# Patient Record
Sex: FEMALE | Race: BLACK OR AFRICAN AMERICAN | Employment: PART TIME | URBAN - METROPOLITAN AREA
[De-identification: names, ages, dates, MRNs, and addresses within clinical notes are randomized per-mention and may not be internally consistent; named-entity substitution may affect disease eponyms.]

---

## 2022-12-28 ENCOUNTER — APPOINTMENT (OUTPATIENT)
Dept: LAB | Facility: CLINIC | Age: 32
End: 2022-12-28

## 2022-12-28 ENCOUNTER — OCCMED (OUTPATIENT)
Dept: URGENT CARE | Facility: CLINIC | Age: 32
End: 2022-12-28

## 2022-12-28 DIAGNOSIS — Z02.1 PRE-EMPLOYMENT HEALTH SCREENING EXAMINATION: Primary | ICD-10-CM

## 2022-12-28 DIAGNOSIS — Z02.1 PRE-EMPLOYMENT HEALTH SCREENING EXAMINATION: ICD-10-CM

## 2022-12-28 LAB — RUBV IGG SERPL IA-ACNC: 43.9 IU/ML

## 2022-12-29 LAB
GAMMA INTERFERON BACKGROUND BLD IA-ACNC: 0.1 IU/ML
M TB IFN-G BLD-IMP: NEGATIVE
M TB IFN-G CD4+ BCKGRND COR BLD-ACNC: 0.01 IU/ML
M TB IFN-G CD4+ BCKGRND COR BLD-ACNC: 0.01 IU/ML
MEV IGG SER QL IA: NORMAL
MITOGEN IGNF BCKGRD COR BLD-ACNC: 7.79 IU/ML
MUV IGG SER QL IA: NORMAL
VZV IGG SER QL IA: NORMAL

## 2023-06-22 ENCOUNTER — OFFICE VISIT (OUTPATIENT)
Dept: FAMILY MEDICINE CLINIC | Facility: CLINIC | Age: 33
End: 2023-06-22
Payer: COMMERCIAL

## 2023-06-22 VITALS
HEART RATE: 71 BPM | SYSTOLIC BLOOD PRESSURE: 112 MMHG | HEIGHT: 64 IN | TEMPERATURE: 97.6 F | BODY MASS INDEX: 33.46 KG/M2 | WEIGHT: 196 LBS | OXYGEN SATURATION: 99 % | DIASTOLIC BLOOD PRESSURE: 66 MMHG

## 2023-06-22 DIAGNOSIS — H93.12 TINNITUS OF LEFT EAR: ICD-10-CM

## 2023-06-22 DIAGNOSIS — Z76.89 ENCOUNTER TO ESTABLISH CARE WITH NEW DOCTOR: Primary | ICD-10-CM

## 2023-06-22 PROCEDURE — 99204 OFFICE O/P NEW MOD 45 MIN: CPT | Performed by: FAMILY MEDICINE

## 2023-06-22 NOTE — PROGRESS NOTES
Name: Gene White      : 1990      MRN: [id number]  Encounter Provider: Emeli Washington DO  Encounter Date: 2023   Encounter department: [facility] [address] [address]     1  Encounter to establish care with new doctor    2  Tinnitus of left ear  -     Ambulatory Referral to Audiology; Future  -     MRI brain w wo contrast; Future; Expected date: 2023     Will evaluate for hearing loss  Due to unilateral presentation, pulsatile description and persistence of greater than 6 months with know identifiable cause, will image as well  BMI Counseling: Body mass index is 33 38 kg/m²  The BMI is above normal  Nutrition recommendations include decreasing portion sizes, encouraging healthy choices of fruits and vegetables, consuming healthier snacks, limiting drinks that contain sugar, moderation in carbohydrate intake, increasing intake of lean protein and reducing intake of cholesterol  Exercise recommendations include moderate physical activity 150 minutes/week and exercising 3-5 times per week  No pharmacotherapy was ordered  Rationale for BMI follow-up plan is due to patient being overweight or obese  Depression Screening and Follow-up Plan: Patient was screened for depression during today's encounter  They screened negative with a PHQ-2 score of 0  Subjective     HPI     Patient presents to the office to establish care  Reports left ear ringing since 2023  Denies any recent ear infections or URI  Notes that the ringing came out of nowhere  Reports that this has been constant since that time  Notes that the last few weeks it has been a little less  Reports that during school it was loud and pulsatile  Describes this as being able to hear her heartbeat in her ear  Denies any issues on the right side  Denies headaches  Denies vision changes  Denies changes in hearing  Notes that laying down sometimes helps the tinnitus  "Unsure is valsalva makes it worse  Denies history of loud noise exposure  Notes that she does wear headphone but this is usually not \"too loud\" and in both ears  Denies frequent ear infections or ear surgery  Review of Systems    History reviewed  No pertinent past medical history  History reviewed  No pertinent surgical history  History reviewed  No pertinent family history  Social History     Socioeconomic History   • Marital status: Unknown     Spouse name: None   • Number of children: None   • Years of education: None   • Highest education level: None   Occupational History   • None   Tobacco Use   • Smoking status: Never   • Smokeless tobacco: Never   Vaping Use   • Vaping Use: Never used   Substance and Sexual Activity   • Alcohol use: Yes   • Drug use: Never   • Sexual activity: Not Currently   Other Topics Concern   • None   Social History Narrative   • None     Social Determinants of Health     Financial Resource Strain: Not on file   Food Insecurity: Not on file   Transportation Needs: Not on file   Physical Activity: Not on file   Stress: Not on file   Social Connections: Not on file   Intimate Partner Violence: Not on file   Housing Stability: Not on file     No current outpatient medications on file prior to visit  No Known Allergies  Immunization History   Administered Date(s) Administered   • Tdap 08/05/2022   • Tuberculin Skin Test-PPD Intradermal 08/17/2022, 09/02/2022       Objective     /66   Pulse 71   Temp 97 6 °F (36 4 °C)   Ht 5' 4 25\" (1 632 m)   Wt 88 9 kg (196 lb)   SpO2 99%   BMI 33 38 kg/m²     Physical Exam  Vitals reviewed  Constitutional:       General: She is not in acute distress  Appearance: Normal appearance  HENT:      Head: Normocephalic and atraumatic  Right Ear: Tympanic membrane, ear canal and external ear normal  There is no impacted cerumen  Left Ear: Tympanic membrane, ear canal and external ear normal  There is no impacted cerumen   " Nose: Nose normal       Mouth/Throat:      Mouth: Mucous membranes are moist    Eyes:      Extraocular Movements: Extraocular movements intact  Conjunctiva/sclera: Conjunctivae normal       Pupils: Pupils are equal, round, and reactive to light  Cardiovascular:      Rate and Rhythm: Normal rate and regular rhythm  Heart sounds: Normal heart sounds  Pulmonary:      Effort: Pulmonary effort is normal       Breath sounds: Normal breath sounds  No wheezing, rhonchi or rales  Abdominal:      General: Bowel sounds are normal  There is no distension  Palpations: Abdomen is soft  Tenderness: There is no abdominal tenderness  Musculoskeletal:      Cervical back: Neck supple  Right lower leg: No edema  Left lower leg: No edema  Lymphadenopathy:      Cervical: No cervical adenopathy  Skin:     General: Skin is warm  Capillary Refill: Capillary refill takes less than 2 seconds  Findings: No rash  Neurological:      General: No focal deficit present  Mental Status: She is alert and oriented to person, place, and time  Mental status is at baseline  Cranial Nerves: No cranial nerve deficit        Gait: Gait normal        Emelia Ritter DO

## 2023-06-27 ENCOUNTER — OFFICE VISIT (OUTPATIENT)
Dept: OBGYN CLINIC | Facility: CLINIC | Age: 33
End: 2023-06-27
Payer: COMMERCIAL

## 2023-06-27 VITALS
WEIGHT: 198 LBS | BODY MASS INDEX: 33.8 KG/M2 | HEIGHT: 64 IN | DIASTOLIC BLOOD PRESSURE: 60 MMHG | SYSTOLIC BLOOD PRESSURE: 102 MMHG

## 2023-06-27 DIAGNOSIS — Z01.419 ENCOUNTER FOR GYNECOLOGICAL EXAMINATION WITHOUT ABNORMAL FINDING: Primary | ICD-10-CM

## 2023-06-27 DIAGNOSIS — Z12.4 SCREENING FOR MALIGNANT NEOPLASM OF THE CERVIX: ICD-10-CM

## 2023-06-27 PROCEDURE — G0145 SCR C/V CYTO,THINLAYER,RESCR: HCPCS | Performed by: OBSTETRICS & GYNECOLOGY

## 2023-06-27 PROCEDURE — G0476 HPV COMBO ASSAY CA SCREEN: HCPCS | Performed by: OBSTETRICS & GYNECOLOGY

## 2023-06-27 PROCEDURE — S0610 ANNUAL GYNECOLOGICAL EXAMINA: HCPCS | Performed by: OBSTETRICS & GYNECOLOGY

## 2023-06-27 NOTE — PROGRESS NOTES
Assessment/Plan:         Diagnoses and all orders for this visit:    Encounter for gynecological examination without abnormal finding    Screening for malignant neoplasm of the cervix  -     Liquid-based pap, screening          Subjective:      Patient ID: John Bond is a 29 y o  female  The patient is a 82-year-old nulligravida who presents for annual exam   She has no complaints related to OB/GYN  She had menarche at age 17, has regular menstrual cycles every month  She has mild menorrhagia which she is only for the first day and dysmenorrhea which is controlled easily with over-the-counter analgesia  She does not miss work or school because of heavy bleeding or cramps  She has no ongoing medical problems and has been in very good health  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  She is also working in the [employer]  We will perform a Pap smear with HPV today  She should return in 1 year or as needed  The following portions of the patient's history were reviewed and updated as appropriate: allergies, current medications, past family history, past medical history, past social history, past surgical history and problem list     Review of Systems   Constitutional: Negative for chills, diaphoresis, fatigue, fever and unexpected weight change  HENT: Negative for congestion, sinus pressure, sinus pain, tinnitus and trouble swallowing  Eyes: Negative for visual disturbance  Respiratory: Negative for cough, chest tightness and shortness of breath  Cardiovascular: Negative for chest pain, palpitations and leg swelling  Gastrointestinal: Negative for abdominal distention, abdominal pain, anal bleeding, constipation, diarrhea, nausea, rectal pain and vomiting  Endocrine: Negative for heat intolerance  Genitourinary: Negative for difficulty urinating, dysuria, flank pain, frequency, genital sores, hematuria and urgency     Musculoskeletal: Negative "for arthralgias, back pain and joint swelling  Skin: Negative for rash  Allergic/Immunologic: Negative for environmental allergies and food allergies  Neurological: Negative for headaches  Hematological: Negative for adenopathy  Does not bruise/bleed easily  Psychiatric/Behavioral: Negative for decreased concentration and dysphoric mood  The patient is not nervous/anxious  Objective:      /60 (BP Location: Left arm)   Ht 5' 4\" (1 626 m)   Wt 89 8 kg (198 lb)   LMP 06/12/2023   BMI 33 99 kg/m²          Physical Exam  Vitals and nursing note reviewed  Exam conducted with a chaperone present  Constitutional:       General: She is not in acute distress  Appearance: Normal appearance  She is normal weight  She is not ill-appearing  HENT:      Head: Normocephalic  Nose: Nose normal       Mouth/Throat:      Mouth: Mucous membranes are moist       Pharynx: Oropharynx is clear  Eyes:      Conjunctiva/sclera: Conjunctivae normal       Pupils: Pupils are equal, round, and reactive to light  Cardiovascular:      Rate and Rhythm: Normal rate and regular rhythm  Pulses: Normal pulses  Pulmonary:      Effort: Pulmonary effort is normal       Breath sounds: Normal breath sounds  Chest:   Breasts: Pranav Score is 5  Right: Normal  No mass, nipple discharge, skin change or tenderness  Left: Normal  No mass, nipple discharge, skin change or tenderness  Abdominal:      General: Abdomen is flat  Bowel sounds are normal       Palpations: Abdomen is soft  Genitourinary:     General: Normal vulva  Exam position: Lithotomy position  Pranav stage (genital): 5       Vagina: Normal       Cervix: Normal       Uterus: Normal        Adnexa: Right adnexa normal and left adnexa normal       Rectum: Normal    Musculoskeletal:         General: Normal range of motion  Cervical back: Neck supple     Lymphadenopathy:      Upper Body:      Right upper body: No axillary " adenopathy  Left upper body: No axillary adenopathy  Skin:     General: Skin is warm and dry  Neurological:      General: No focal deficit present  Mental Status: She is alert     Psychiatric:         Mood and Affect: Mood normal

## 2023-06-29 LAB
HPV HR 12 DNA CVX QL NAA+PROBE: NEGATIVE
HPV16 DNA CVX QL NAA+PROBE: NEGATIVE
HPV18 DNA CVX QL NAA+PROBE: NEGATIVE

## 2023-07-06 LAB
LAB AP GYN PRIMARY INTERPRETATION: NORMAL
LAB AP LMP: NORMAL
Lab: NORMAL

## 2023-07-14 ENCOUNTER — HOSPITAL ENCOUNTER (OUTPATIENT)
Dept: MRI IMAGING | Facility: CLINIC | Age: 33
Discharge: HOME | End: 2023-07-14
Payer: COMMERCIAL

## 2023-07-14 DIAGNOSIS — H93.12 TINNITUS OF LEFT EAR: ICD-10-CM

## 2023-07-14 PROCEDURE — G1004 CDSM NDSC: HCPCS

## 2023-07-14 PROCEDURE — A9585 GADOBUTROL INJECTION: HCPCS | Performed by: FAMILY MEDICINE

## 2023-07-14 PROCEDURE — 70553 MRI BRAIN STEM W/O & W/DYE: CPT

## 2023-07-14 RX ADMIN — GADOBUTROL 6 ML: 604.72 INJECTION INTRAVENOUS at 10:02

## 2023-07-19 ENCOUNTER — TELEPHONE (OUTPATIENT)
Dept: FAMILY MEDICINE CLINIC | Facility: CLINIC | Age: 33
End: 2023-07-19

## 2023-07-19 NOTE — TELEPHONE ENCOUNTER
Pt returned call about MRI results. Pt was given Dr Souza [place] advisement. Grossly unremarkable. Can discussed cerebellar ectopia at next visit. This is not uncommon or problematic.

## 2023-07-25 ENCOUNTER — OFFICE VISIT (OUTPATIENT)
Dept: FAMILY MEDICINE CLINIC | Facility: CLINIC | Age: 33
End: 2023-07-25
Payer: COMMERCIAL

## 2023-07-25 VITALS
TEMPERATURE: 97.8 F | WEIGHT: 189 LBS | DIASTOLIC BLOOD PRESSURE: 72 MMHG | SYSTOLIC BLOOD PRESSURE: 106 MMHG | BODY MASS INDEX: 32.27 KG/M2 | OXYGEN SATURATION: 97 % | HEART RATE: 55 BPM | HEIGHT: 64 IN

## 2023-07-25 DIAGNOSIS — Z00.00 ANNUAL PHYSICAL EXAM: Primary | ICD-10-CM

## 2023-07-25 DIAGNOSIS — Z11.1 PPD SCREENING TEST: ICD-10-CM

## 2023-07-25 PROCEDURE — 99395 PREV VISIT EST AGE 18-39: CPT | Performed by: FAMILY MEDICINE

## 2023-07-25 PROCEDURE — 86580 TB INTRADERMAL TEST: CPT

## 2023-07-25 NOTE — PROGRESS NOTES
[address]    NAME: Kyle Mark  AGE: 29 y.o. SEX: female  : 1990     DATE: 2023     Assessment and Plan:     Problem List Items Addressed This Visit    None  Visit Diagnoses     Annual physical exam    -  Primary    PPD screening test        Relevant Orders    TB Skin Test (Completed)        Immunizations and preventive care screenings were discussed with patient today. Appropriate education was printed on patient's after visit summary. Counseling:  Alcohol/drug use: discussed moderation in alcohol intake, the recommendations for healthy alcohol use, and avoidance of illicit drug use. Dental Health: discussed importance of regular tooth brushing, flossing, and dental visits. Sexual health: discussed sexually transmitted diseases, partner selection, use of condoms, avoidance of unintended pregnancy, and contraceptive alternatives. Exercise: the importance of regular exercise/physical activity was discussed. Recommend exercise 3-5 times per week for at least 30 minutes. No follow-ups on file. Chief Complaint:     Chief Complaint   Patient presents with   • Physical Exam      History of Present Illness:     Adult Annual Physical   Patient here for a comprehensive physical exam. The patient reports no problems. Diet and Physical Activity  Diet/Nutrition: well balanced diet. Exercise: moderate cardiovascular exercise, strength training exercises, 1-2 times a week on average and 30-60 minutes on average. Depression Screening  PHQ-2/9 Depression Screening    Little interest or pleasure in doing things: 0 - not at all  Feeling down, depressed, or hopeless: 0 - not at all  PHQ-2 Score: 0  PHQ-2 Interpretation: Negative depression screen       General Health  Sleep: sleeps well and gets 4-6 hours of sleep on average. Hearing: normal - bilateral. Left ear with ringing. Vision: goes for regular eye exams, most recent eye exam <1 year ago and wears glasses. Dental: regular dental visits, brushes teeth twice daily and flosses teeth occasionally. /GYN Health  Last menstrual period: 7/6/23  Contraceptive method: none. History of STDs?: no.     Review of Systems:     Review of Systems      Past Medical History:     History reviewed. No pertinent past medical history. Past Surgical History:     Past Surgical History:   Procedure Laterality Date   • WISDOM TOOTH EXTRACTION        Social History:     Social History     Socioeconomic History   • Marital status: Unknown     Spouse name: None   • Number of children: None   • Years of education: None   • Highest education level: None   Occupational History   • None   Tobacco Use   • Smoking status: Never   • Smokeless tobacco: Never   Vaping Use   • Vaping Use: Never used   Substance and Sexual Activity   • Alcohol use: Yes   • Drug use: Never   • Sexual activity: Not Currently   Other Topics Concern   • None   Social History Narrative   • None     Social Determinants of Health     Financial Resource Strain: Not on file   Food Insecurity: Not on file   Transportation Needs: Not on file   Physical Activity: Not on file   Stress: Not on file   Social Connections: Not on file   Intimate Partner Violence: Not on file   Housing Stability: Not on file      Family History:     Family History   Problem Relation Age of Onset   • No Known Problems Mother    • No Known Problems Father       Current Medications:     No current outpatient medications on file. No current facility-administered medications for this visit. Allergies:     No Known Allergies   Physical Exam:     /72 (BP Location: Left arm, Patient Position: Sitting, Cuff Size: Standard)   Pulse 55   Temp 97.8 °F (36.6 °C) (Tympanic)   Ht 5' 4" (1.626 m)   Wt 85.7 kg (189 lb)   LMP 06/12/2023   SpO2 97%   BMI 32.44 kg/m²     Physical Exam  Vitals reviewed. Constitutional:       General: She is not in acute distress. Appearance: Normal appearance. HENT:      Head: Normocephalic and atraumatic. Right Ear: External ear normal.      Left Ear: External ear normal.      Nose: Nose normal.      Mouth/Throat:      Mouth: Mucous membranes are moist.   Eyes:      Extraocular Movements: Extraocular movements intact. Conjunctiva/sclera: Conjunctivae normal.      Pupils: Pupils are equal, round, and reactive to light. Cardiovascular:      Rate and Rhythm: Normal rate and regular rhythm. Heart sounds: Normal heart sounds. Pulmonary:      Effort: Pulmonary effort is normal.      Breath sounds: Normal breath sounds. Abdominal:      General: Bowel sounds are normal. There is no distension. Palpations: Abdomen is soft. Tenderness: There is no abdominal tenderness. Musculoskeletal:      Cervical back: Neck supple. Right lower leg: No edema. Left lower leg: No edema. Lymphadenopathy:      Cervical: No cervical adenopathy. Skin:     General: Skin is warm. Capillary Refill: Capillary refill takes less than 2 seconds. Findings: No rash. Neurological:      Mental Status: She is alert. Mental status is at baseline.         Tahir Cifuentes [address]  [address] [place] [address]

## 2023-07-27 ENCOUNTER — CLINICAL SUPPORT (OUTPATIENT)
Dept: FAMILY MEDICINE CLINIC | Facility: CLINIC | Age: 33
End: 2023-07-27

## 2023-07-27 DIAGNOSIS — Z11.1 PPD SCREENING TEST: Primary | ICD-10-CM

## 2023-07-27 LAB
INDURATION: 0 MM
TB SKIN TEST: NEGATIVE

## 2023-07-27 NOTE — PROGRESS NOTES
PPD Reading Note  PPD read and results entered in [facility]. Result: 0.0 mm induration.   Interpretation: negative   If test not read within 48-72 hours of initial placement, patient advised to repeat in other arm 1-3 weeks after this test.  Allergic reaction: no

## 2023-08-07 ENCOUNTER — CLINICAL SUPPORT (OUTPATIENT)
Dept: FAMILY MEDICINE CLINIC | Facility: CLINIC | Age: 33
End: 2023-08-07
Payer: COMMERCIAL

## 2023-08-07 DIAGNOSIS — Z11.1 PPD SCREENING TEST: Primary | ICD-10-CM

## 2023-08-07 PROCEDURE — 86580 TB INTRADERMAL TEST: CPT

## 2023-08-07 NOTE — PROGRESS NOTES
PPD Placement note  Dariela Speck, 29 y.o. female is here today for placement of 2nd PPD test  Reason for PPD test: Employment   Pt taken PPD test before: yes  Verified in allergy area and with patient that they are not allergic to the products PPD is made of (Phenol or Tween). Yes  Is patient taking any oral or IV steroid medication now or have they taken it in the last month? no  Has the patient ever received the BCG vaccine?: no  Has the patient been in recent contact with anyone known or suspected of having active TB disease?: no      PPD placed on 8/7/2023. Patient advised to return for reading within 48-72 hours.

## 2023-08-09 ENCOUNTER — CLINICAL SUPPORT (OUTPATIENT)
Dept: FAMILY MEDICINE CLINIC | Facility: CLINIC | Age: 33
End: 2023-08-09

## 2023-08-09 DIAGNOSIS — Z11.1 PPD SCREENING TEST: Primary | ICD-10-CM

## 2023-08-09 LAB
INDURATION: 0 MM
TB SKIN TEST: NEGATIVE

## 2023-08-09 NOTE — PROGRESS NOTES
PPD Reading Note  PPD read and results entered in [facility]. Result: 0.0 mm induration.   Interpretation: negative  If test not read within 48-72 hours of initial placement, patient advised to repeat in other arm 1-3 weeks after this test.  Allergic reaction: no

## 2023-08-11 ENCOUNTER — OFFICE VISIT (OUTPATIENT)
Dept: AUDIOLOGY | Age: 33
End: 2023-08-11
Payer: COMMERCIAL

## 2023-08-11 DIAGNOSIS — H93.12 TINNITUS OF LEFT EAR: ICD-10-CM

## 2023-08-11 PROCEDURE — 92567 TYMPANOMETRY: CPT | Performed by: AUDIOLOGIST

## 2023-08-11 PROCEDURE — 92557 COMPREHENSIVE HEARING TEST: CPT | Performed by: AUDIOLOGIST

## 2023-08-11 NOTE — PROGRESS NOTES
HEARING EVALUATION    Name:  Kyle Mark  :  1990  Age:  29 y.o. Date of Evaluation: 23     History: Tinnitus  Reason for visit: Kyle Mark is being seen today at the request of Dr. Delphine Ovalle for an evaluation of hearing. Patient reports intermittent left ear pulsatile tinnitus and feeling of head being clogged. Patient had an MRI of the brain that was unremarkable. Patient has a history of childhood ear infections. She denies dizziness and changes to hearing. She denies history of noise exposure. EVALUATION:    Otoscopic Evaluation:   Right Ear: Clear and healthy ear canal and tympanic membrane   Left Ear: Clear and healthy ear canal and tympanic membrane    Tympanometry:   Right: Type Ad - hypermobile compliance   Left: Type Ad - hypermobile compliance    Audiogram Results:  Essentially normal hearing bilaterally, with a conductive component present in the left ear at 500 to 1,000 Hz. SRT is elevated compared to PTA. WRS is excellent bilaterally. *see attached audiogram      RECOMMENDATIONS:  Consult ENT re: intermittent unilateral pulsatile tinnitus  Annual hearing eval, Return to [facility]. for F/U and Copy to Patient/Caregiver    PATIENT EDUCATION:   Discussed results and recommendations with patient. Questions were addressed and the patient was encouraged to contact our department should concerns arise.       Erika Landon Asa., CCC-A  Clinical Audiologist

## 2023-08-22 ENCOUNTER — PATIENT MESSAGE (OUTPATIENT)
Dept: FAMILY MEDICINE CLINIC | Facility: CLINIC | Age: 33
End: 2023-08-22

## 2023-08-24 NOTE — PATIENT COMMUNICATION
Called and LVM informing pt forms are ready to be picked up and that she will need vision checked for the form. Form in pending bin on side 1.

## 2023-08-24 NOTE — TELEPHONE ENCOUNTER
From: Prachi Miner  To: Ailin Sheriff  Sent: 8/22/2023 7:57 PM EDT  Subject: Car Meeks, can you please complete and sign this document? Hi, I hope all is well with you, Dr. Rodger Mcmillan. As the title of this message indicates, I am in need of another document signed and approved by you for my schooling. It just a physical form indicating I am capable of functioning and working in a job setting. 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 I will also attach my past immunizations for reference, as I believe this physical form inquiries about that. If there is anything you need from me, please do not hesitate to ask. Please and thank you!

## 2023-08-28 ENCOUNTER — CLINICAL SUPPORT (OUTPATIENT)
Dept: FAMILY MEDICINE CLINIC | Facility: CLINIC | Age: 33
End: 2023-08-28

## 2023-08-28 DIAGNOSIS — Z01.00 ENCOUNTER FOR VISION SCREENING: Primary | ICD-10-CM

## 2023-08-28 NOTE — TELEPHONE ENCOUNTER
Its a very quick screening that needs to be completed for the form.      Rodney Montilla [facility]  8/28/2023 12:57 PM

## 2023-08-28 NOTE — PATIENT COMMUNICATION
Pt returned call -- asking if she has to have another vision test being she just had one at her eye dr about a month ago. Pt will call her eye dr to get the vision test results. Pt will call back.

## 2024-01-03 ENCOUNTER — OFFICE VISIT (OUTPATIENT)
Dept: URGENT CARE | Facility: CLINIC | Age: 34
End: 2024-01-03
Payer: COMMERCIAL

## 2024-01-03 VITALS
RESPIRATION RATE: 16 BRPM | OXYGEN SATURATION: 100 % | SYSTOLIC BLOOD PRESSURE: 103 MMHG | TEMPERATURE: 97.3 F | HEART RATE: 63 BPM | DIASTOLIC BLOOD PRESSURE: 58 MMHG

## 2024-01-03 DIAGNOSIS — R05.1 ACUTE COUGH: Primary | ICD-10-CM

## 2024-01-03 LAB
SARS-COV-2 AG UPPER RESP QL IA: NEGATIVE
VALID CONTROL: NORMAL

## 2024-01-03 PROCEDURE — 99213 OFFICE O/P EST LOW 20 MIN: CPT | Performed by: FAMILY MEDICINE

## 2024-01-03 PROCEDURE — 87636 SARSCOV2 & INF A&B AMP PRB: CPT | Performed by: FAMILY MEDICINE

## 2024-01-03 PROCEDURE — 87811 SARS-COV-2 COVID19 W/OPTIC: CPT | Performed by: FAMILY MEDICINE

## 2024-01-03 RX ORDER — BENZONATATE 200 MG/1
200 CAPSULE ORAL 3 TIMES DAILY PRN
Qty: 20 CAPSULE | Refills: 0 | Status: SHIPPED | OUTPATIENT
Start: 2024-01-03

## 2024-01-03 NOTE — LETTER
January 3, 2024     Patient: Jacqui Deleno   YOB: 1990   Date of Visit: 1/3/2024       To Whom It May Concern:    Jacqui Deleon was evaluated in my office on 1/3/2024.  Please excuse her absence.  Was tested for COVID-19 and influenza and instructed to self quarantine until her results are received.  If negative, she may return to work on 1/4/2024 if symptoms are improved.  If you have any questions or concerns, please don't hesitate to call.         Sincerely,        Nhi Parks MD

## 2024-01-03 NOTE — PROGRESS NOTES
[facility]        NAME: Jacqui Deleon is a 33 y.o. female  : 1990    MRN: [id number]  DATE: January 3, 2024  TIME: 12:33 PM    Assessment and Plan   Acute cough [R05.1]  1. Acute cough  Covid/Flu-Office Collect    Poct Covid 19 Rapid Antigen Test    benzonatate (TESSALON) 200 MG capsule        Negative rapid COVID.  COVID and flu PCR pending.  Supportive measures such as hydration and OTC pain relievers.  Tessalon Perles for cough suppression.    Patient Instructions     Follow up with PCP in 3-5 days.  Proceed to  ER if symptoms worsen.    Chief Complaint     Chief Complaint   Patient presents with    Cold Like Symptoms     With cough, congestion, fatigue, started last Thursday. On and off, and felt worse this morning         History of Present Illness     33-year-old female presents today with almost 1 week of flulike symptoms including fatigue, chills, nasal congestion, rhinorrhea, coughing preceded by sore throat which is since resolved.  Denies any obvious fevers.  Reports that her sister had similar symptoms prior to the onset of hers.      Review of Systems   Review of Systems   Constitutional:  Positive for chills and fatigue.   HENT:  Positive for congestion, rhinorrhea and sore throat (resolved).    Respiratory:  Positive for cough. Negative for shortness of breath.    Cardiovascular:  Negative for chest pain.   Gastrointestinal:  Negative for abdominal pain and nausea.   Neurological:  Negative for dizziness and headaches.     Current Medications       Current Outpatient Medications:     benzonatate (TESSALON) 200 MG capsule, Take 1 capsule (200 mg total) by mouth 3 (three) times a day as needed for cough, Disp: 20 capsule, Rfl: 0    Current Allergies     Allergies as of 2024    (No Known Allergies)            The following portions of the patient's history were reviewed and updated as appropriate: allergies, current medications, past family history, past medical history, past  social history, past surgical history and problem list.     History reviewed. No pertinent past medical history.    Past Surgical History:   Procedure Laterality Date    WISDOM TOOTH EXTRACTION         Family History   Problem Relation Age of Onset    No Known Problems Mother     No Known Problems Father          Medications have been verified.        Objective   /58   Pulse 63   Temp (!) 97.3 °F (36.3 °C)   Resp 16   SpO2 100%   No LMP recorded.       Physical Exam     Physical Exam  Vitals and nursing note reviewed.   Constitutional:       General: She is in acute distress.      Appearance: Normal appearance. She is not ill-appearing or toxic-appearing.   HENT:      Head: Normocephalic and atraumatic.      Nose: Congestion and rhinorrhea present.      Comments: Inflamed nasal mucosa     Mouth/Throat:      Mouth: Mucous membranes are moist.      Pharynx: No posterior oropharyngeal erythema.   Eyes:      General:         Right eye: No discharge.         Left eye: No discharge.      Conjunctiva/sclera: Conjunctivae normal.   Cardiovascular:      Rate and Rhythm: Normal rate and regular rhythm.   Pulmonary:      Effort: Pulmonary effort is normal. No respiratory distress.      Breath sounds: Normal breath sounds. No wheezing, rhonchi or rales.   Skin:     General: Skin is warm.      Findings: No erythema.   Neurological:      General: No focal deficit present.      Mental Status: She is alert and oriented to person, place, and time.   Psychiatric:         Mood and Affect: Mood normal.         Behavior: Behavior normal.         Thought Content: Thought content normal.         Judgment: Judgment normal.

## 2024-01-04 LAB
FLUAV RNA RESP QL NAA+PROBE: NEGATIVE
FLUBV RNA RESP QL NAA+PROBE: NEGATIVE
SARS-COV-2 RNA RESP QL NAA+PROBE: NEGATIVE

## 2024-01-16 ENCOUNTER — TELEMEDICINE (OUTPATIENT)
Dept: DERMATOLOGY | Facility: CLINIC | Age: 34
End: 2024-01-16
Payer: COMMERCIAL

## 2024-01-16 DIAGNOSIS — L70.0 ACNE VULGARIS: Primary | ICD-10-CM

## 2024-01-16 DIAGNOSIS — Z79.899 HIGH RISK MEDICATION USE: ICD-10-CM

## 2024-01-16 PROCEDURE — 99204 OFFICE O/P NEW MOD 45 MIN: CPT | Performed by: STUDENT IN AN ORGANIZED HEALTH CARE EDUCATION/TRAINING PROGRAM

## 2024-01-16 RX ORDER — SPIRONOLACTONE 50 MG/1
TABLET, FILM COATED ORAL
Qty: 60 TABLET | Refills: 3 | Status: CANCELLED | OUTPATIENT
Start: 2024-01-16

## 2024-01-16 RX ORDER — SPIRONOLACTONE 25 MG/1
TABLET ORAL
Qty: 180 TABLET | Refills: 1 | Status: SHIPPED | OUTPATIENT
Start: 2024-01-16

## 2024-01-16 NOTE — PROGRESS NOTES
Virtual Regular Visit    Verification of patient location:    Patient is located at Home in the following state in which I hold an active license [place]      Assessment/Plan:    Problem List Items Addressed This Visit    None  Visit Diagnoses       Acne vulgaris    -  Primary                 Reason for visit is   Chief Complaint   Patient presents with    Virtual Regular Visit          Encounter provider Javy Caraballo MD    Provider located at DERMATOLOGY [address] [place] [address] [phone]      Recent Visits  No visits were found meeting these conditions.  Showing recent visits within past 7 days and meeting all other requirements  Today's Visits  Date Type Provider Dept   01/16/24 Telemedicine Javy Caraballo MD  Dermatology [facility]   Showing today's visits and meeting all other requirements  Future Appointments  No visits were found meeting these conditions.  Showing future appointments within next 150 days and meeting all other requirements       The patient was identified by name and date of birth. Jacqui Deleon was informed that this is a telemedicine visit and that the visit is being conducted through the Epic Embedded platform. She agrees to proceed..  My office door was closed. The patient was notified the following individuals were present in the room HIRAL Clemente.  She acknowledged consent and understanding of privacy and security of the video platform. The patient has agreed to participate and understands they can discontinue the visit at any time.    Patient is aware this is a billable service.     Subjective  Jacqui Deleon is a 33 y.o. female for Acne .      HPI     History reviewed. No pertinent past medical history.    Past Surgical History:   Procedure Laterality Date    WISDOM TOOTH EXTRACTION         Current Outpatient Medications   Medication Sig Dispense Refill    benzonatate (TESSALON) 200  "MG capsule Take 1 capsule (200 mg total) by mouth 3 (three) times a day as needed for cough (Patient not taking: Reported on 1/16/2024) 20 capsule 0     No current facility-administered medications for this visit.        No Known Allergies    Review of Systems    Video Exam    There were no vitals filed for this visit.    Physical Exam     Visit Time  Total Visit Duration: 15 mins    [facility] Dermatology Clinic Note     Patient Name: Jacqui Deleon  Encounter Date: 01/16/2024     Have you been cared for by a [facility] Dermatologist in the last 3 years and, if so, which description applies to you?    NO.   I am considered a \"new\" patient and must complete all patient intake questions. I am FEMALE/of child-bearing potential.    REVIEW OF SYSTEMS:  Have you recently had or currently have any of the following? Recent fever or chills? No  Any non-healing wound? No  Are you pregnant or planning to become pregnant? No  Are you currently or planning to be nursing or breast feeding? No   PAST MEDICAL HISTORY:  Have you personally ever had or currently have any of the following?  If \"YES,\" then please provide more detail. Skin cancer (such as Melanoma, Basal Cell Carcinoma, Squamous Cell Carcinoma?  No  Tuberculosis, HIV/AIDS, Hepatitis B or C: No  Radiation Treatment No   HISTORY OF IMMUNOSUPPRESSION:   Do you have a history of any of the following:  Systemic Immunosuppression such as Diabetes, Biologic or Immunotherapy, Chemotherapy, Organ Transplantation, Bone Marrow Transplantation?  No    Answering \"YES\" requires the addition of the dotphrase \"IMMUNOSUPPRESSED\" as the first diagnosis of the patient's visit.   FAMILY HISTORY:  Any \"first degree relatives\" (parent, brother, sister, or child) with the following?    Skin Cancer, Pancreatic or Other Cancer? No   PATIENT EXPERIENCE:    Do you want the Dermatologist to perform a COMPLETE skin exam today including a clinical examination under the \"bra and underwear\" areas?  " "NO  If necessary, do we have your permission to call and leave a detailed message on your Preferred Phone number that includes your specific medical information?  Yes      No Known Allergies   Current Outpatient Medications:     benzonatate (TESSALON) 200 MG capsule, Take 1 capsule (200 mg total) by mouth 3 (three) times a day as needed for cough (Patient not taking: Reported on 1/16/2024), Disp: 20 capsule, Rfl: 0          Whom besides the patient is providing clinical information about today's encounter?   NO ADDITIONAL HISTORIAN (patient alone provided history)    Physical Exam and Assessment/Plan by Diagnosis:    ACNE VULGARIS (\"COMMON ACNE\")    Physical Exam:  Anatomic Location Affected:  Chin, jawline  Morphological Description: Scattered erythematous papules, pustules, cystic lesions, hyperpigmentation at prior active areas  Pertinent Positives:  Pertinent Negatives:    Additional History of Present Condition:  Patient using a Sentrinsic Acne Treatment + Clearing Gel 2% Salicylic Acid for couple months and recently started a turmeric scrub/ soap.     History notable for acne that flares around menses  Discussed that history and physical are consistent with hormonal acne   Educated that hormonal acne does particularly well with medications that target hormones, including spironolactone and OCP. Patient prefers to stay away from OCP at this time.  Denies family history of breast cancer, personal history of low blood pressure, liver disease, kidney disease, hyperkalemia  Educated that unless over age 45 or with a history of renal/liver disease, hyperkalemia, or medication interactions, evidence-based medicine does not support routine lab studies in patients on spironolactone.   The potential link to estrogen-sensitive cancers in high dose animal studies such as breast cancer was discussed and the patient was counseled that the most recent meta analyses on spironolactone in humans on typical dosing has not " demonstrated this risk.       Discussed that treatment is directed at improving skin appearance and reducing the likelihood of scarring. Discussed theraputic ladder including topical OTC treatments, topical prescriptions, and oral medications. Discussed side effects as noted below.    Plan today:     Prescription for combination cream will be sent to speciality pharmacy at:  [facility]     We use this service to prescribe medications that are often not covered by insurance.    Your physician will send your prescription to the pharmacy.    You will receive an email from [email or web address] where you will follow the instructions within the email to provide your billing and shipping information.    Your medicine will be shipped directly to you.    If you have any questions, you can call [phone] or email [email or web address]   Follow up in 3 months    AM:  - Start gentle cleanser  - Start non-comedogenic moisturizer such as CeraVe, Cetaphil or Vanicream.   - SPF 30 or greater (can be a lotion with SPF like CeraVe AM)       PM:    - Start gentle cleanser. If wearing makeup, advice double cleansing (oil based cleanser, make up removal balm or micellar water first followed by gentle cleanser)  Start spironolactone 25 mg PO DAILY. Educated to start with 25 mg nightly for two weeks then increase 50 mg to BID dosing if tolerated. S/E reviewed including menstrual irregularity, breast tenderness, headaches, GI upset, K+ retention, palpitations, teratogenicity/feminization of male fetus. Patient does have BP that runs around 100s/high 50s. 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   Start tretinoin azelaic acid niacinamide compounded cream nightly to face followed by non-comedogenic moisturizer. Start two nights a week and increase to nightly as  "tolerated. If retinoid is too drying, may employ the \"sandwich method.\" To do this, apply layer of non-comedogenic moisturizer, followed by layer of retinoid, followed by another layer of non-comedogenic moisturizer.       Call with any questions or concerns before next follow-up visit; do not stop medications abruptly without consulting provider. Call our office at [phone] ([facility]).  It is better to be safe than to be sorry!      Patient's information:  Phone   [phone] (M)    Email   [email or web address]          Scribe Attestation      I,:  Macy Monaco MA am acting as a scribe while in the presence of the attending physician.:       I,:  Javy Caraballo MD personally performed the services described in this documentation    as scribed in my presence.:                 "

## 2024-03-18 ENCOUNTER — TELEPHONE (OUTPATIENT)
Dept: FAMILY MEDICINE CLINIC | Facility: CLINIC | Age: 34
End: 2024-03-18

## 2024-03-18 DIAGNOSIS — Z00.00 ANNUAL PHYSICAL EXAM: Primary | ICD-10-CM

## 2024-03-18 NOTE — TELEPHONE ENCOUNTER
Pt stopped into office -- presented to me : Forms for Observation Experience which needs to completed  incl vax info and pts previous Immunizations /  Vaccine Report.   Records uploaded and forwarded to our Clinical Team for a review/updates.     Copy attached to this t/c note - please pull information Under Media.     Pts last seen for PE on 07/25/2024, assisted pt with an appt PE scheduling - pt asking for a set of labs / fbw / Carrying Starts with us to be entered into EPIC.     Pt signed form fee and forms left in providers bin. Call pt for payment and office  when completed.     Please assist! Thank You!

## 2024-03-19 NOTE — TELEPHONE ENCOUNTER
She is not due for labs until 7/2024, before her physical. Caring starts with your labs do not get entered by us. The patient just tells the labs that they need that blood work drawn. Other labs ordered for completion in July.     Romina Mcmillan DO  [facility]  3/19/2024 7:36 AM

## 2024-03-20 NOTE — TELEPHONE ENCOUNTER
Pt returned call - pt will stop by the office to complete, date and sign forms. Left in Pending Bin Side 2.     Also -  spoke w pt to clarify reason for Covid Vax exemption - pt states [employer].         Please advise

## 2024-03-21 NOTE — TELEPHONE ENCOUNTER
Pt stopped by the office - forms completed, labs reprinted, pt  filled and signed her portion  on forms, updated Immunization Summary reprinted - copies made for scanning, attached to this t/c note.  Pt very appreciative.  No charge per .

## 2024-04-04 ENCOUNTER — TELEPHONE (OUTPATIENT)
Dept: FAMILY MEDICINE CLINIC | Facility: CLINIC | Age: 34
End: 2024-04-04

## 2024-04-04 NOTE — TELEPHONE ENCOUNTER
First attempt to contact patient to schedule an appointment . The patient did not answer. A voicemail was left to contact the office.    Transcribed VM :     Hi, good afternoon. My name is Lata Deleon I'm a patient of doctor Romina Mcmillan exist existing patient. I I'm just calling today to see if I could schedule an appointment with her. It's just in regards to my hand. I've been feeling a lot of like weakness in it for the past two days. Like I can't  anything like heavy. So I feel like I should probably get that checked out right away, but definitely give me a call back at [phone]. Once again, my name is Lata and I'm just coming in for a medical concern. Just wanted to make an appointment with my primary provider, Doctor Romina Mcmillan. And once again my number is [phone]. Hoping to hear from someone soon. OK, Thank you and have a great day. gavino Gipson.

## 2024-04-04 NOTE — TELEPHONE ENCOUNTER
Pt returned call - c/o R hand issues - appt scheduled with  for tomorrow on 4/5/2024 as sdsa @11:00 AM - pt has requested time/date of appt, able to get the time off from work (works at [employer]).

## 2024-04-05 ENCOUNTER — OFFICE VISIT (OUTPATIENT)
Dept: FAMILY MEDICINE CLINIC | Facility: CLINIC | Age: 34
End: 2024-04-05
Payer: COMMERCIAL

## 2024-04-05 VITALS
TEMPERATURE: 97.7 F | HEIGHT: 64 IN | SYSTOLIC BLOOD PRESSURE: 110 MMHG | BODY MASS INDEX: 35 KG/M2 | OXYGEN SATURATION: 98 % | HEART RATE: 55 BPM | WEIGHT: 205 LBS | DIASTOLIC BLOOD PRESSURE: 68 MMHG

## 2024-04-05 DIAGNOSIS — S63.501A SPRAIN OF RIGHT WRIST, INITIAL ENCOUNTER: Primary | ICD-10-CM

## 2024-04-05 PROCEDURE — 99214 OFFICE O/P EST MOD 30 MIN: CPT | Performed by: FAMILY MEDICINE

## 2024-04-05 RX ORDER — IBUPROFEN 600 MG/1
600 TABLET ORAL EVERY 6 HOURS PRN
Qty: 30 TABLET | Refills: 0 | Status: SHIPPED | OUTPATIENT
Start: 2024-04-05

## 2024-04-05 NOTE — PROGRESS NOTES
"Assessment/Plan:    No problem-specific Assessment & Plan notes found for this encounter.     Diagnoses and all orders for this visit:    Sprain of right wrist, initial encounter  -     ibuprofen (MOTRIN) 600 mg tablet; Take 1 tablet (600 mg total) by mouth every 6 (six) hours as needed for mild pain        Subjective:      Patient ID: Jacqui Deleon is a 33 y.o. female.    HPI    Reports that since Tuesday she has had weakness in her right hand. Reports that she has not been able to lift over 5 lbs since that time. Notes dull pain with twisting or rotating. Denies numbness or tingling today. Notes that she had a faint episode of tingling on Wednesday but it resolved. Denies issues with the elbow or shoulder.   Hard time with brushing teeth.   States that she is able to make a fist.   Has not taken anything or use anything OTC to help.     States that she was playing with her nephew, tossing him in the air and then noticed this the following day.     The following portions of the patient's history were reviewed and updated as appropriate: allergies, current medications, past family history, past medical history, past social history, past surgical history, and problem list.    Review of Systems      Objective:  /68 (BP Location: Left arm, Patient Position: Sitting, Cuff Size: Standard)   Pulse 55   Temp 97.7 °F (36.5 °C) (Tympanic)   Ht 5' 4\" (1.626 m)   Wt 93 kg (205 lb)   SpO2 98%   BMI 35.19 kg/m²      Physical Exam  Vitals reviewed.   Constitutional:       General: She is not in acute distress.     Appearance: Normal appearance.   HENT:      Head: Normocephalic and atraumatic.      Right Ear: External ear normal.      Left Ear: External ear normal.      Mouth/Throat:      Mouth: Mucous membranes are moist.   Eyes:      Extraocular Movements: Extraocular movements intact.      Conjunctiva/sclera: Conjunctivae normal.   Cardiovascular:      Rate and Rhythm: Normal rate and regular rhythm.   Pulmonary: "      Effort: Pulmonary effort is normal.   Musculoskeletal:         General: No swelling, tenderness or deformity.      Comments: Right wrist with normal ROM except mild pain limiting flexion. Non tender.    Skin:     Capillary Refill: Capillary refill takes less than 2 seconds.   Neurological:      Mental Status: She is alert. Mental status is at baseline.         DO Jose Castro [facility]  4/5/2024 12:56 PM

## 2024-04-23 ENCOUNTER — TELEMEDICINE (OUTPATIENT)
Dept: DERMATOLOGY | Facility: CLINIC | Age: 34
End: 2024-04-23
Payer: COMMERCIAL

## 2024-04-23 VITALS — WEIGHT: 195 LBS | BODY MASS INDEX: 32.49 KG/M2 | HEIGHT: 65 IN

## 2024-04-23 DIAGNOSIS — L70.0 ACNE VULGARIS: Primary | ICD-10-CM

## 2024-04-23 PROCEDURE — 99214 OFFICE O/P EST MOD 30 MIN: CPT | Performed by: STUDENT IN AN ORGANIZED HEALTH CARE EDUCATION/TRAINING PROGRAM

## 2024-04-23 RX ORDER — ADAPALENE AND BENZOYL PEROXIDE GEL, 0.1%/2.5% 1; 25 MG/G; MG/G
GEL TOPICAL
Qty: 45 G | Refills: 3 | Status: SHIPPED | OUTPATIENT
Start: 2024-04-23

## 2024-04-23 NOTE — PROGRESS NOTES
Virtual Regular Visit    Verification of patient location:    Patient is located at Home in the following state in which I hold an active license [place]      Assessment/Plan:    Problem List Items Addressed This Visit    None           Reason for visit is   Chief Complaint   Patient presents with    Virtual Regular Visit          Encounter provider Javy Caraballo MD    Provider located at DERMATOLOGY [address] [place] [address] [phone]      Recent Visits  No visits were found meeting these conditions.  Showing recent visits within past 7 days and meeting all other requirements  Today's Visits  Date Type Provider Dept   04/23/24 Telemedicine Javy Caraballo MD [facility]   Showing today's visits and meeting all other requirements  Future Appointments  No visits were found meeting these conditions.  Showing future appointments within next 150 days and meeting all other requirements       The patient was identified by name and date of birth. Jacqui Deleon was informed that this is a telemedicine visit and that the visit is being conducted through the Epic Embedded platform. She agrees to proceed..  My office door was closed. No one else was in the room.  She acknowledged consent and understanding of privacy and security of the video platform. The patient has agreed to participate and understands they can discontinue the visit at any time.    Patient is aware this is a billable service.     Subjective  Jacqui Deleon is a 33 y.o. female here for an acne follow up .      HPI     No past medical history on file.    Past Surgical History:   Procedure Laterality Date    WISDOM TOOTH EXTRACTION         Current Outpatient Medications   Medication Sig Dispense Refill    benzonatate (TESSALON) 200 MG capsule Take 1 capsule (200 mg total) by mouth 3 (three) times a day as needed for cough (Patient not taking: Reported on  "1/16/2024) 20 capsule 0    ibuprofen (MOTRIN) 600 mg tablet Take 1 tablet (600 mg total) by mouth every 6 (six) hours as needed for mild pain 30 tablet 0    spironolactone (ALDACTONE) 25 mg tablet Take 1 pill (25 mg) once daily for two weeks. Take with large glass of water. If well-tolerated, increase to 2 pills (50 mg) daily. STOP IMMEDIATELY IF YOU BECOME UNINTENTIONALLY PREGNANT or if you decide to plan for pregnancy. Avoid large amounts of high potassium food or beverages while taking this medication. 180 tablet 1     No current facility-administered medications for this visit.        No Known Allergies    Review of Systems    Video Exam    There were no vitals filed for this visit.    Physical Exam     Visit Time  Total Visit Duration: 10 minutes    [facility] Dermatology Clinic Note     Patient Name: Jacqui Deleon  Encounter Date: 4/23/24     Have you been cared for by a [facility] Dermatologist in the last 3 years and, if so, which description applies to you?    Yes.  I have been here within the last 3 years, and my medical history has NOT changed since that time.  I am FEMALE/of child-bearing potential.    REVIEW OF SYSTEMS:  Have you recently had or currently have any of the following? No changes in my recent health.   PAST MEDICAL HISTORY:  Have you personally ever had or currently have any of the following?  If \"YES,\" then please provide more detail. No changes in my medical history.   HISTORY OF IMMUNOSUPPRESSION: Do you have a history of any of the following:  Systemic Immunosuppression such as Diabetes, Biologic or Immunotherapy, Chemotherapy, Organ Transplantation, Bone Marrow Transplantation?  No     Answering \"YES\" requires the addition of the dotphrase \"IMMUNOSUPPRESSED\" as the first diagnosis of the patient's visit.   FAMILY HISTORY:  Any \"first degree relatives\" (parent, brother, sister, or child) with the following?    No changes in my family's known health.   PATIENT EXPERIENCE:    Do you " "want the Dermatologist to perform a COMPLETE skin exam today including a clinical examination under the \"bra and underwear\" areas?  NO  If necessary, do we have your permission to call and leave a detailed message on your Preferred Phone number that includes your specific medical information?  Yes      No Known Allergies   Current Outpatient Medications:     benzonatate (TESSALON) 200 MG capsule, Take 1 capsule (200 mg total) by mouth 3 (three) times a day as needed for cough (Patient not taking: Reported on 1/16/2024), Disp: 20 capsule, Rfl: 0    ibuprofen (MOTRIN) 600 mg tablet, Take 1 tablet (600 mg total) by mouth every 6 (six) hours as needed for mild pain, Disp: 30 tablet, Rfl: 0    spironolactone (ALDACTONE) 25 mg tablet, Take 1 pill (25 mg) once daily for two weeks. Take with large glass of water. If well-tolerated, increase to 2 pills (50 mg) daily. STOP IMMEDIATELY IF YOU BECOME UNINTENTIONALLY PREGNANT or if you decide to plan for pregnancy. Avoid large amounts of high potassium food or beverages while taking this medication., Disp: 180 tablet, Rfl: 1          Whom besides the patient is providing clinical information about today's encounter?   NO ADDITIONAL HISTORIAN (patient alone provided history)    Physical Exam and Assessment/Plan by Diagnosis:    ACNE VULGARIS (\"COMMON ACNE\")     Physical Exam:  Anatomic Location Affected:  Chin, jawline  Morphological Description: Scattered erythematous papules, pustules, cystic lesions, hyperpigmentation at prior active areas  Pertinent Positives:  Pertinent Negatives:     Additional History of Present Condition:    Patient feels acne is doing ok but not great. She reports having another small breakout around her period. Patient reports it has mostly been stable but she is getting some stubborn pimples that stay around for weeks. Patient reports stopping taking spironolactone because of financial reasons. Patient reports the medication was between $15-25 for 1 " "month of medication (she thinks it was 1 month and not 3 months but she is unsure). Patient reports requesting a refill but she wasn't sure if she had another. She decided not to pick it up because she felt her acne was doing relatively well and she was concerned about finances. Patient reports feeling that it worked. She didn't have any breakouts when she was on it. Patient reports she did get up to the 50 mg daily. Patient is still using skin medicinals combination cream but would like a less expensive alternative.      History notable for acne that flares around menses that improved with spironolactone. However, given financial considerations will hold on this for now and will trial patient on topical medications only. Previously prescribed skin medicinals compounded cream but will move towards a prescription retinoid to try to decrease financial burden while maintaining an effective regimen.        Discussed that treatment is directed at improving skin appearance and reducing the likelihood of scarring. Discussed theraputic ladder including topical OTC treatments, topical prescriptions, and oral medications. Discussed side effects as noted below.     Plan today:       AM:  - Start gentle cleanser  - Start non-comedogenic moisturizer such as CeraVe, Cetaphil or Vanicream.   - SPF 30 or greater (can be a lotion with SPF like CeraVe AM)        PM:     - Continue gentle cleanser. If wearing makeup, advice double cleansing (oil based cleanser, make up removal balm or micellar water first followed by gentle cleanser)  Discontinue spironolactone at this time  Continue using tretinoin azelaic acid niacinamide compounded cream nightly to face followed by non-comedogenic moisturizer. Start two nights a week and increase to nightly as tolerated. If retinoid is too drying, may employ the \"sandwich method.\" To do this, apply layer of non-comedogenic moisturizer, followed by layer of retinoid, followed by another layer of " "non-comedogenic moisturizer.   When compounded medication has run out, transition to the below medication:  Epiduo 0.1% gel: Use a pea-sized amount on entire face once daily at night. If it is too drying, may employ the \"sandwich method.\" To do this, apply layer of non-comedogenic moisturizer, followed by layer of retinoid, followed by another layer of non-comedogenic moisturizer.   If epiduo is not covered, would recommend over the counter adapalene 0.1% gel          Call with any questions or concerns before next follow-up visit; do not stop medications abruptly without consulting provider. Call our office at [phone] ([facility]).  It is better to be safe than to be sorry!        Patient's information:  Phone   [phone] (M)    Email   [email or web address]        Scribe Attestation      I,:  Katherine Montoya am acting as a scribe while in the presence of the attending physician.:       I,:  Javy Caraballo MD personally performed the services described in this documentation    as scribed in my presence.:             "

## 2024-04-23 NOTE — PATIENT INSTRUCTIONS
" Follow up in 3 months     AM:  - Start gentle cleanser  - Start non-comedogenic moisturizer such as CeraVe, Cetaphil or Vanicream.   - SPF 30 or greater (can be a lotion with SPF like CeraVe AM)        PM:     - Continue gentle cleanser. If wearing makeup, advice double cleansing (oil based cleanser, make up removal balm or micellar water first followed by gentle cleanser)  Discontinue spironolactone at this time  Continue using tretinoin azelaic acid niacinamide compounded cream nightly to face followed by non-comedogenic moisturizer. Start two nights a week and increase to nightly as tolerated. If retinoid is too drying, may employ the \"sandwich method.\" To do this, apply layer of non-comedogenic moisturizer, followed by layer of retinoid, followed by another layer of non-comedogenic moisturizer.   Either use epiduo 0.1% gel or over-the-counter differin gel (0.1% adapalene), depending on which is more affordable  Epiduo 0.1% gel: Use a pea-sized amount on entire face once daily at night. If it is too drying, may employ the \"sandwich method.\" To do this, apply layer of non-comedogenic moisturizer, followed by layer of retinoid, followed by another layer of non-comedogenic moisturizer.   "

## 2024-07-02 ENCOUNTER — ANNUAL EXAM (OUTPATIENT)
Dept: OBGYN CLINIC | Facility: CLINIC | Age: 34
End: 2024-07-02
Payer: COMMERCIAL

## 2024-07-02 VITALS
DIASTOLIC BLOOD PRESSURE: 66 MMHG | BODY MASS INDEX: 34.62 KG/M2 | WEIGHT: 207.8 LBS | HEIGHT: 65 IN | SYSTOLIC BLOOD PRESSURE: 100 MMHG

## 2024-07-02 DIAGNOSIS — Z12.4 SCREENING FOR MALIGNANT NEOPLASM OF THE CERVIX: ICD-10-CM

## 2024-07-02 DIAGNOSIS — Z01.419 ENCOUNTER FOR GYNECOLOGICAL EXAMINATION WITHOUT ABNORMAL FINDING: Primary | ICD-10-CM

## 2024-07-02 PROCEDURE — S0612 ANNUAL GYNECOLOGICAL EXAMINA: HCPCS | Performed by: OBSTETRICS & GYNECOLOGY

## 2024-07-02 PROCEDURE — G0476 HPV COMBO ASSAY CA SCREEN: HCPCS | Performed by: OBSTETRICS & GYNECOLOGY

## 2024-07-02 PROCEDURE — G0145 SCR C/V CYTO,THINLAYER,RESCR: HCPCS | Performed by: OBSTETRICS & GYNECOLOGY

## 2024-07-02 NOTE — PROGRESS NOTES
Ambulatory Visit  Name: Jacqui Deleon      : 1990      MRN: [id number]  Encounter Provider: Saira Pal MD  Encounter Date: 2024   Encounter department: [facility]    Assessment & Plan   1. Encounter for gynecological examination without abnormal finding      History of Present Illness     Jacqui Deleon is a 33 y.o.nulligravida female who presents for annual exam.  She has no complaints related to OB/GYN.  She had menarche at age 14, has regular menstrual cycles every month.  She has mild menorrhagia which she is only for the first day and dysmenorrhea which is controlled easily with over-the-counter analgesia.  She does not miss work or school because of heavy bleeding or cramps.  Noticed some increase in discharge but it seems to be either immediately following her menses or ovulatory mucus.  She denies fever, chills, dysuria, pruritus or any other symptoms associated with the discharge.  She has no ongoing medical problems and has been in very good health.  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  We will perform a Pap smear with HPV today.  She should return in 1 year or as needed.     Review of Systems   Constitutional:  Negative for chills, diaphoresis, fatigue, fever and unexpected weight change.   HENT:  Negative for congestion, sinus pressure, sinus pain, tinnitus and trouble swallowing.    Eyes:  Negative for visual disturbance.   Respiratory:  Negative for cough, chest tightness and shortness of breath.    Cardiovascular:  Negative for chest pain, palpitations and leg swelling.   Gastrointestinal:  Negative for abdominal distention, abdominal pain, anal bleeding, constipation, diarrhea, nausea, rectal pain and vomiting.   Endocrine: Negative for heat intolerance.   Genitourinary:  Negative for difficulty urinating, dysuria, flank pain, frequency, genital sores, hematuria and urgency.  "  Musculoskeletal:  Negative for arthralgias, back pain and joint swelling.   Skin:  Negative for rash.   Allergic/Immunologic: Negative for environmental allergies and food allergies.   Neurological:  Negative for headaches.   Hematological:  Negative for adenopathy. Does not bruise/bleed easily.   Psychiatric/Behavioral:  Negative for decreased concentration and dysphoric mood. The patient is not nervous/anxious.        Objective     /66 (BP Location: Left arm, Patient Position: Sitting, Cuff Size: Adult)   Ht 5' 5\" (1.651 m)   Wt 94.3 kg (207 lb 12.8 oz)   LMP 06/26/2024 (Approximate)   BMI 34.58 kg/m²     Physical Exam  Vitals reviewed. Exam conducted with a chaperone present.   Constitutional:       Appearance: Normal appearance.   HENT:      Mouth/Throat:      Mouth: Mucous membranes are moist.      Pharynx: Oropharynx is clear.   Eyes:      Conjunctiva/sclera: Conjunctivae normal.      Pupils: Pupils are equal, round, and reactive to light.   Cardiovascular:      Rate and Rhythm: Normal rate and regular rhythm.      Pulses: Normal pulses.      Heart sounds: Normal heart sounds.   Pulmonary:      Effort: Pulmonary effort is normal.      Breath sounds: Normal breath sounds.   Abdominal:      General: Bowel sounds are normal.      Palpations: Abdomen is soft.   Genitourinary:     General: Normal vulva.      Exam position: Lithotomy position.      Pranav stage (genital): 5.      Vagina: Normal.      Cervix: Normal.      Uterus: Normal.       Adnexa: Right adnexa normal and left adnexa normal.      Rectum: Normal.   Musculoskeletal:         General: Normal range of motion.      Cervical back: Neck supple.   Skin:     General: Skin is warm and dry.   Neurological:      General: No focal deficit present.      Mental Status: She is alert and oriented to person, place, and time.   Psychiatric:         Mood and Affect: Mood normal.       Administrative Statements           "

## 2024-07-11 LAB
LAB AP GYN PRIMARY INTERPRETATION: NORMAL
Lab: NORMAL

## 2024-07-23 ENCOUNTER — TELEMEDICINE (OUTPATIENT)
Dept: DERMATOLOGY | Facility: CLINIC | Age: 34
End: 2024-07-23

## 2024-07-23 VITALS — HEIGHT: 65 IN | WEIGHT: 200 LBS | BODY MASS INDEX: 33.32 KG/M2

## 2024-07-23 DIAGNOSIS — L70.0 ACNE VULGARIS: Primary | ICD-10-CM

## 2024-07-23 RX ORDER — DAPSONE 75 MG/G
GEL TOPICAL
Qty: 60 G | Refills: 3 | Status: SHIPPED | OUTPATIENT
Start: 2024-07-23

## 2024-07-23 NOTE — PROGRESS NOTES
"[facility] Dermatology Clinic Note     Patient Name: Jacqui Deloen  Encounter Date: 7/23/2024     Have you been cared for by a [facility] Dermatologist in the last 3 years and, if so, which description applies to you?    Yes.  I have been here within the last 3 years, and my medical history has NOT changed since that time.  I am FEMALE/of child-bearing potential.    REVIEW OF SYSTEMS:  Have you recently had or currently have any of the following? No changes in my recent health.   PAST MEDICAL HISTORY:  Have you personally ever had or currently have any of the following?  If \"YES,\" then please provide more detail. No changes in my medical history.   HISTORY OF IMMUNOSUPPRESSION: Do you have a history of any of the following:  Systemic Immunosuppression such as Diabetes, Biologic or Immunotherapy, Chemotherapy, Organ Transplantation, Bone Marrow Transplantation?  No     Answering \"YES\" requires the addition of the dotphrase \"IMMUNOSUPPRESSED\" as the first diagnosis of the patient's visit.   FAMILY HISTORY:  Any \"first degree relatives\" (parent, brother, sister, or child) with the following?    No changes in my family's known health.   PATIENT EXPERIENCE:    Do you want the Dermatologist to perform a COMPLETE skin exam today including a clinical examination under the \"bra and underwear\" areas?  NO  If necessary, do we have your permission to call and leave a detailed message on your Preferred Phone number that includes your specific medical information?  Yes      No Known Allergies   Current Outpatient Medications:     Adapalene-Benzoyl Peroxide 0.1-2.5 % gel, Use a pea-sized amount on entire face once daily at night. Start using 2 nights a week and increase to nightly as tolerated. If it is too drying, may employ the \"sandwich method.\" To do this, apply layer of non-comedogenic moisturizer, followed by layer of retinoid, followed by another layer of non-comedogenic moisturizer., Disp: 45 g, Rfl: 3    benzonatate " "(TESSALON) 200 MG capsule, Take 1 capsule (200 mg total) by mouth 3 (three) times a day as needed for cough, Disp: 20 capsule, Rfl: 0    ibuprofen (MOTRIN) 600 mg tablet, Take 1 tablet (600 mg total) by mouth every 6 (six) hours as needed for mild pain, Disp: 30 tablet, Rfl: 0    spironolactone (ALDACTONE) 25 mg tablet, Take 1 pill (25 mg) once daily for two weeks. Take with large glass of water. If well-tolerated, increase to 2 pills (50 mg) daily. STOP IMMEDIATELY IF YOU BECOME UNINTENTIONALLY PREGNANT or if you decide to plan for pregnancy. Avoid large amounts of high potassium food or beverages while taking this medication., Disp: 180 tablet, Rfl: 1          Whom besides the patient is providing clinical information about today's encounter?   NO ADDITIONAL HISTORIAN (patient alone provided history)    Physical Exam and Assessment/Plan by Diagnosis:    ACNE VULGARIS (\"COMMON ACNE\")     Physical Exam:  Anatomic Location Affected:  Chin, jawline  Morphological Description: Scattered erythematous papules, pustules, cystic lesions, hyperpigmentation at prior active areas  Pertinent Positives:  Pertinent Negatives:     Additional History of Present Condition:  Patient is here for a acne follow up. She is currently using Adapalene-Benzoyl Peroxide 0.1-2.5 % gel TIW and as a spot treatment she notes improvement but she is struggling with hyperpigmentation, she notes that the right side of her face gets more pimples that the other side, she notes that the pimples are not cystic but they are painful. She notes having combination skin but its more dry than oily.       History notable for acne that flares around menses that improved with spironolactone. However, given financial considerations will hold on this for now and will trial patient on topical medications only. Previously prescribed skin medicinals compounded cream but will move towards a prescription retinoid to try to decrease financial burden while maintaining an " "effective regimen.         Discussed that treatment is directed at improving skin appearance and reducing the likelihood of scarring. Discussed theraputic ladder including topical OTC treatments, topical prescriptions, and oral medications. Discussed side effects as noted below.     Plan today:        AM:  - Continue gentle cleanser  - Start Dapsone 7.5% gel   - Continue non-comedogenic moisturizer such as CeraVe, Cetaphil or Vanicream.   - SPF 30 or greater (can be a lotion with SPF like CeraVe AM)        PM:     - Continue gentle cleanser. If wearing makeup, advice double cleansing (oil based cleanser, make up removal balm or micellar water first followed by gentle cleanser)  Continue Epiduo 0.1% gel: Use a pea-sized amount on entire face once daily at night. May try to increase to nightly if tolerated. If it is too drying, may employ the \"sandwich method.\" To do this, apply layer of non-comedogenic moisturizer, followed by layer of retinoid, followed by another layer of non-comedogenic moisturizer.            Call with any questions or concerns before next follow-up visit; do not stop medications abruptly without consulting provider. Call our office at [phone] (SKIN).  It is better to be safe than to be sorry!      Scribe Attestation      I,:  Keri Hoffman am acting as a scribe while in the presence of the attending physician.:       I,:  Javy Caraballo MD personally performed the services described in this documentation    as scribed in my presence.:              "

## 2024-07-26 ENCOUNTER — OFFICE VISIT (OUTPATIENT)
Dept: FAMILY MEDICINE CLINIC | Facility: CLINIC | Age: 34
End: 2024-07-26
Payer: COMMERCIAL

## 2024-07-26 VITALS
WEIGHT: 203.4 LBS | HEART RATE: 61 BPM | BODY MASS INDEX: 33.89 KG/M2 | SYSTOLIC BLOOD PRESSURE: 102 MMHG | TEMPERATURE: 97.7 F | HEIGHT: 65 IN | OXYGEN SATURATION: 99 % | DIASTOLIC BLOOD PRESSURE: 58 MMHG

## 2024-07-26 DIAGNOSIS — Z00.00 ANNUAL PHYSICAL EXAM: Primary | ICD-10-CM

## 2024-07-26 DIAGNOSIS — Z13.220 ENCOUNTER FOR LIPID SCREENING FOR CARDIOVASCULAR DISEASE: ICD-10-CM

## 2024-07-26 DIAGNOSIS — Z13.6 ENCOUNTER FOR LIPID SCREENING FOR CARDIOVASCULAR DISEASE: ICD-10-CM

## 2024-07-26 PROCEDURE — 99395 PREV VISIT EST AGE 18-39: CPT | Performed by: FAMILY MEDICINE

## 2024-07-26 NOTE — PROGRESS NOTES
Adult Annual Physical  Name: Jacqui Deleon      : 1990      MRN: [id number]  Encounter Provider: Romina Mcmillan DO  Encounter Date: 2024   Encounter department: [place] [id number] [address]    Assessment & Plan   1. Annual physical exam  2. BMI 33.0-33.9,adult  3. Encounter for lipid screening for cardiovascular disease  -     Lipid panel; Future    CBC and CMP ordered previously for completion.     Immunizations and preventive care screenings were discussed with patient today. Appropriate education was printed on patient's after visit summary.    Counseling:  Alcohol/drug use: discussed moderation in alcohol intake, the recommendations for healthy alcohol use, and avoidance of illicit drug use.  Dental Health: discussed importance of regular tooth brushing, flossing, and dental visits.  Sexual health: discussed sexually transmitted diseases, partner selection, use of condoms, avoidance of unintended pregnancy, and contraceptive alternatives.  Exercise: the importance of regular exercise/physical activity was discussed. Recommend exercise 3-5 times per week for at least 30 minutes.       Depression Screening and Follow-up Plan: Patient was screened for depression during today's encounter. They screened negative with a PHQ-2 score of 0.      History of Present Illness     Adult Annual Physical:  Patient presents for annual physical.     Diet and Physical Activity:  - Diet/Nutrition: well balanced diet and frequent junk food. notes that she has a carb heavy diet, working on improving this  - Exercise: moderate cardiovascular exercise, 3-4 times a week on average and walking.    Depression Screening:  - PHQ-2 Score: 0    General Health:  - Sleep: sleeps well.  - Hearing: normal hearing bilateral ears.  - Vision: wears glasses, goes for regular eye exams and most recent eye exam < 1 year ago.  - Dental: brushes teeth twice daily and regular dental visits. flossing  "daily    /GYN Health:  - Follows with GYN: yes.   - Menopause: premenopausal.   - Last menstrual cycle: 7/21/2024.   - History of STDs: no    Review of Systems      Objective     /58   Pulse 61   Temp 97.7 °F (36.5 °C) (Tympanic)   Ht 5' 5\" (1.651 m)   Wt 92.3 kg (203 lb 6.4 oz)   LMP 06/26/2024 (Approximate)   SpO2 99%   BMI 33.85 kg/m²     Physical Exam  Vitals reviewed.   Constitutional:       General: She is not in acute distress.     Appearance: Normal appearance.   HENT:      Head: Normocephalic and atraumatic.      Right Ear: External ear normal.      Left Ear: External ear normal.      Nose: Nose normal.      Mouth/Throat:      Mouth: Mucous membranes are moist.   Eyes:      Extraocular Movements: Extraocular movements intact.      Conjunctiva/sclera: Conjunctivae normal.      Pupils: Pupils are equal, round, and reactive to light.   Cardiovascular:      Rate and Rhythm: Normal rate and regular rhythm.      Heart sounds: Normal heart sounds.   Pulmonary:      Effort: Pulmonary effort is normal.      Breath sounds: Normal breath sounds.   Abdominal:      General: Bowel sounds are normal. There is no distension.      Palpations: Abdomen is soft.      Tenderness: There is no abdominal tenderness.   Musculoskeletal:      Cervical back: Neck supple.      Right lower leg: No edema.      Left lower leg: No edema.   Lymphadenopathy:      Cervical: No cervical adenopathy.   Skin:     General: Skin is warm.      Capillary Refill: Capillary refill takes less than 2 seconds.      Findings: No rash.   Neurological:      Mental Status: She is alert. Mental status is at baseline.         DO Jose Castro [facility]  7/26/2024 12:13 PM      "

## 2024-07-26 NOTE — PATIENT INSTRUCTIONS
"Patient Education     Routine physical for adults   The Basics   Written by the doctors and editors at [facility]   What is a physical? -- A physical is a routine visit, or \"check-up,\" with your doctor. You might also hear it called a \"wellness visit\" or \"preventive visit.\"  During each visit, the doctor will:   Ask about your physical and mental health   Ask about your habits, behaviors, and lifestyle   Do an exam   Give you vaccines if needed   Talk to you about any medicines you take   Give advice about your health   Answer your questions  Getting regular check-ups is an important part of taking care of your health. It can help your doctor find and treat any problems you have. But it's also important for preventing health problems.  A routine physical is different from a \"sick visit.\" A sick visit is when you see a doctor because of a health concern or problem. Since physicals are scheduled ahead of time, you can think about what you want to ask the doctor.  How often should I get a physical? -- It depends on your age and health. In general, for people age 21 years and older:   If you are younger than 50 years, you might be able to get a physical every 3 years.   If you are 50 years or older, your doctor might recommend a physical every year.  If you have an ongoing health condition, like diabetes or high blood pressure, your doctor will probably want to see you more often.  What happens during a physical? -- In general, each visit will include:   Physical exam - The doctor or nurse will check your height, weight, heart rate, and blood pressure. They will also look at your eyes and ears. They will ask about how you are feeling and whether you have any symptoms that bother you.   Medicines - It's a good idea to bring a list of all the medicines you take to each doctor visit. Your doctor will talk to you about your medicines and answer any questions. Tell them if you are having any side effects that bother you. You " "should also tell them if you are having trouble paying for any of your medicines.   Habits and behaviors - This includes:   Your diet   Your exercise habits   Whether you smoke, drink alcohol, or use drugs   Whether you are sexually active   Whether you feel safe at home  Your doctor will talk to you about things you can do to improve your health and lower your risk of health problems. They will also offer help and support. For example, if you want to quit smoking, they can give you advice and might prescribe medicines. If you want to improve your diet or get more physical activity, they can help you with this, too.   Lab tests, if needed - The tests you get will depend on your age and situation. For example, your doctor might want to check your:   Cholesterol   Blood sugar   Iron level   Vaccines - The recommended vaccines will depend on your age, health, and what vaccines you already had. Vaccines are very important because they can prevent certain serious or deadly infections.   Discussion of screening - \"Screening\" means checking for diseases or other health problems before they cause symptoms. Your doctor can recommend screening based on your age, risk, and preferences. This might include tests to check for:   Cancer, such as breast, prostate, cervical, ovarian, colorectal, prostate, lung, or skin cancer   Sexually transmitted infections, such as chlamydia and gonorrhea   Mental health conditions like depression and anxiety  Your doctor will talk to you about the different types of screening tests. They can help you decide which screenings to have. They can also explain what the results might mean.   Answering questions - The physical is a good time to ask the doctor or nurse questions about your health. If needed, they can refer you to other doctors or specialists, too.  Adults older than 65 years often need other care, too. As you get older, your doctor will talk to you about:   How to prevent falling at " home   Hearing or vision tests   Memory testing   How to take your medicines safely   Making sure that you have the help and support you need at home  All topics are updated as new evidence becomes available and our peer review process is complete.  This topic retrieved from [employer] on: May 02, 2024.  Topic 927388 Version 1.0  Release: 32.4.3 - C32.122  © 2024 UpToDate, Inc. and/or its affiliates. All rights reserved.  Consumer Information Use and Disclaimer   Disclaimer: This generalized information is a limited summary of diagnosis, treatment, and/or medication information. It is not meant to be comprehensive and should be used as a tool to help the user understand and/or assess potential diagnostic and treatment options. It does NOT include all information about conditions, treatments, medications, side effects, or risks that may apply to a specific patient. It is not intended to be medical advice or a substitute for the medical advice, diagnosis, or treatment of a health care provider based on the health care provider's examination and assessment of a patient's specific and unique circumstances. Patients must speak with a health care provider for complete information about their health, medical questions, and treatment options, including any risks or benefits regarding use of medications. This information does not endorse any treatments or medications as safe, effective, or approved for treating a specific patient. UpToDate, Inc. and its affiliates disclaim any warranty or liability relating to this information or the use thereof.The use of this information is governed by the Terms of Use, available at https://www.woltersTipCityuwer.com/en/know/clinical-effectiveness-terms. 2024© UpToDate, Inc. and its affiliates and/or licensors. All rights reserved.  Copyright   © 2024 UpToDate, Inc. and/or its affiliates. All rights reserved.

## 2024-09-10 ENCOUNTER — TELEPHONE (OUTPATIENT)
Age: 34
End: 2024-09-10

## 2024-09-10 NOTE — TELEPHONE ENCOUNTER
Patient called regarding a balance from her past virtual visit with Dr. Caraballo . Advised to call billing department .   Patient verbally admitted to understand .

## 2024-09-17 ENCOUNTER — PATIENT MESSAGE (OUTPATIENT)
Dept: DERMATOLOGY | Facility: CLINIC | Age: 34
End: 2024-09-17

## 2024-11-01 ENCOUNTER — APPOINTMENT (OUTPATIENT)
Age: 34
End: 2024-11-01
Payer: COMMERCIAL

## 2024-11-01 ENCOUNTER — OFFICE VISIT (OUTPATIENT)
Age: 34
End: 2024-11-01
Payer: COMMERCIAL

## 2024-11-01 VITALS
BODY MASS INDEX: 33.82 KG/M2 | WEIGHT: 203 LBS | SYSTOLIC BLOOD PRESSURE: 101 MMHG | RESPIRATION RATE: 16 BRPM | HEIGHT: 65 IN | DIASTOLIC BLOOD PRESSURE: 68 MMHG | HEART RATE: 56 BPM

## 2024-11-01 DIAGNOSIS — Z00.00 ANNUAL PHYSICAL EXAM: ICD-10-CM

## 2024-11-01 DIAGNOSIS — Z13.6 ENCOUNTER FOR LIPID SCREENING FOR CARDIOVASCULAR DISEASE: ICD-10-CM

## 2024-11-01 DIAGNOSIS — Z13.220 ENCOUNTER FOR LIPID SCREENING FOR CARDIOVASCULAR DISEASE: ICD-10-CM

## 2024-11-01 DIAGNOSIS — M20.5X2 ADDUCTOVARUS ROTATION OF TOE, ACQUIRED, LEFT: Primary | ICD-10-CM

## 2024-11-01 DIAGNOSIS — L84 CALLUS OF TOE: ICD-10-CM

## 2024-11-01 LAB
ALBUMIN SERPL BCG-MCNC: 3.7 G/DL (ref 3.5–5)
ALP SERPL-CCNC: 62 U/L (ref 34–104)
ALT SERPL W P-5'-P-CCNC: 20 U/L (ref 7–52)
ANION GAP SERPL CALCULATED.3IONS-SCNC: 7 MMOL/L (ref 4–13)
AST SERPL W P-5'-P-CCNC: 21 U/L (ref 13–39)
BASOPHILS # BLD AUTO: 0.01 THOUSANDS/ΜL (ref 0–0.1)
BASOPHILS NFR BLD AUTO: 0 % (ref 0–1)
BILIRUB SERPL-MCNC: 0.41 MG/DL (ref 0.2–1)
BUN SERPL-MCNC: 14 MG/DL (ref 5–25)
CALCIUM SERPL-MCNC: 8.8 MG/DL (ref 8.4–10.2)
CHLORIDE SERPL-SCNC: 102 MMOL/L (ref 96–108)
CHOLEST SERPL-MCNC: 140 MG/DL
CO2 SERPL-SCNC: 28 MMOL/L (ref 21–32)
CREAT SERPL-MCNC: 0.6 MG/DL (ref 0.6–1.3)
EOSINOPHIL # BLD AUTO: 0.14 THOUSAND/ΜL (ref 0–0.61)
EOSINOPHIL NFR BLD AUTO: 3 % (ref 0–6)
ERYTHROCYTE [DISTWIDTH] IN BLOOD BY AUTOMATED COUNT: 12.8 % (ref 11.6–15.1)
GFR SERPL CREATININE-BSD FRML MDRD: 121 ML/MIN/1.73SQ M
GLUCOSE P FAST SERPL-MCNC: 80 MG/DL (ref 65–99)
HCT VFR BLD AUTO: 38 % (ref 34.8–46.1)
HDLC SERPL-MCNC: 41 MG/DL
HGB BLD-MCNC: 11.8 G/DL (ref 11.5–15.4)
IMM GRANULOCYTES # BLD AUTO: 0.02 THOUSAND/UL (ref 0–0.2)
IMM GRANULOCYTES NFR BLD AUTO: 0 % (ref 0–2)
LDLC SERPL CALC-MCNC: 84 MG/DL (ref 0–100)
LYMPHOCYTES # BLD AUTO: 1.85 THOUSANDS/ΜL (ref 0.6–4.47)
LYMPHOCYTES NFR BLD AUTO: 36 % (ref 14–44)
MCH RBC QN AUTO: 29.1 PG (ref 26.8–34.3)
MCHC RBC AUTO-ENTMCNC: 31.1 G/DL (ref 31.4–37.4)
MCV RBC AUTO: 94 FL (ref 82–98)
MONOCYTES # BLD AUTO: 0.48 THOUSAND/ΜL (ref 0.17–1.22)
MONOCYTES NFR BLD AUTO: 9 % (ref 4–12)
NEUTROPHILS # BLD AUTO: 2.71 THOUSANDS/ΜL (ref 1.85–7.62)
NEUTS SEG NFR BLD AUTO: 52 % (ref 43–75)
NONHDLC SERPL-MCNC: 99 MG/DL
NRBC BLD AUTO-RTO: 0 /100 WBCS
PLATELET # BLD AUTO: 253 THOUSANDS/UL (ref 149–390)
PMV BLD AUTO: 11.1 FL (ref 8.9–12.7)
POTASSIUM SERPL-SCNC: 4.2 MMOL/L (ref 3.5–5.3)
PROT SERPL-MCNC: 7.1 G/DL (ref 6.4–8.4)
RBC # BLD AUTO: 4.06 MILLION/UL (ref 3.81–5.12)
SODIUM SERPL-SCNC: 137 MMOL/L (ref 135–147)
TRIGL SERPL-MCNC: 73 MG/DL
WBC # BLD AUTO: 5.21 THOUSAND/UL (ref 4.31–10.16)

## 2024-11-01 PROCEDURE — 99203 OFFICE O/P NEW LOW 30 MIN: CPT | Performed by: STUDENT IN AN ORGANIZED HEALTH CARE EDUCATION/TRAINING PROGRAM

## 2024-11-01 PROCEDURE — 80053 COMPREHEN METABOLIC PANEL: CPT

## 2024-11-01 PROCEDURE — 85025 COMPLETE CBC W/AUTO DIFF WBC: CPT

## 2024-11-01 PROCEDURE — 36415 COLL VENOUS BLD VENIPUNCTURE: CPT

## 2024-11-01 PROCEDURE — 80061 LIPID PANEL: CPT

## 2024-11-01 NOTE — PROGRESS NOTES
"[facility] Podiatric Medicine and Surgery Office Visit    ASSESSMENT     Diagnoses and all orders for this visit:    Adductovarus rotation of toe, acquired, left    Callus of toe         Problem List Items Addressed This Visit    None  Visit Diagnoses       Adductovarus rotation of toe, acquired, left    -  Primary    Callus of toe              PLAN  -Patient was educated regarding their condition  -Hyperkeratotic tissue was pared utilizing a #15 blade x1 without incident to level of healthy epidermis  -Patient was provided with padding to offload area  -Recommend use of moisturizer to feet daily  -Return to clinic 8-weeks      SUBJECTIVE    Chief Complaint:  Left pinky toe pain     Patient ID: Jacqui Deleon     11/1/2024: Jacqui is a pleasant 33-year-old female who presents today for evaluation of her left foot.  She states that her left pinky toe has been hurting her for the past 2 months.  She denies any injury to the area.  She denies any swelling but states that it is red sometimes and irritated.  She has not attempted any treatment so far.        The following portions of the patient's history were reviewed and updated as appropriate: allergies, current medications, past family history, past medical history, past social history, past surgical history and problem list.    Review of Systems   Constitutional: Negative.    HENT: Negative.     Respiratory: Negative.     Cardiovascular: Negative.    Gastrointestinal: Negative.    Musculoskeletal: Negative.    Skin:         Callus of toe   Neurological: Negative.          OBJECTIVE      /68   Pulse 56   Resp 16   Ht 5' 5\" (1.651 m)   Wt 92.1 kg (203 lb)   BMI 33.78 kg/m²        Physical Exam  Constitutional:       Appearance: Normal appearance.   HENT:      Head: Normocephalic and atraumatic.   Eyes:      General:         Right eye: No discharge.         Left eye: No discharge.   Cardiovascular:      Rate and Rhythm: Normal rate and regular rhythm.     "  Pulses:           Dorsalis pedis pulses are 2+ on the right side and 2+ on the left side.        Posterior tibial pulses are 2+ on the right side and 2+ on the left side.   Pulmonary:      Effort: Pulmonary effort is normal.      Breath sounds: Normal breath sounds.   Skin:     General: Skin is warm.      Capillary Refill: Capillary refill takes less than 2 seconds.   Neurological:      Sensory: Sensation is intact. No sensory deficit.         Vascular:  -DP and PT pulses intact b/l  -Capillary refill time <2 sec b/l  -Digital hair growth: Present  -Skin temp: WNL    MSK:  -Pain on palpation to left fifth digit at the dorsal lateral aspect just adjacent to the lateral nail border  -left fifth digit is in a position of adductovarus with frontal plane rotation causing the dorsal lateral aspect of the toe to be inferior and pressing on the ground.  -MMT is 5/5 to all muscle compartments of the lower extremity  -Ankle dorsiflexion >10 degrees with knee extended and knee flexed b/l    Derm:  -No lesions, abrasions, or open wounds noted  -No noted interdigital maceration, peeling, malodor  -Hyperkeratotic tissue noted to the dorsal lateral aspect of the left fifth digit just adjacent to the lateral nail border

## 2024-11-01 NOTE — PATIENT INSTRUCTIONS
Purchase a supportive pair of sneakers such as Caraballo, Hoka, Saucony, New Balance, On Cloud, Altra.  Some qualities to look for is that the shoe should bend only where the toes bend, the sole should not be too flimsy, it should have a wide toe box and a somewhat stiff heel support. Purchase a supportive over-the-counter pair of inserts such as Superfeet, powersteps, tread labs.    [employer]  [address] [facility] [address]  [phone]     [employer]  [address], [place] [address]  [phone]     [employer]  [address] [place] [address] [phone]     [employer]   [address] [facility]  [phone]     [employer]  [address], [place], [place] [address] [phone]     [employer]  [address] [place], [place] [id number] [phone]     [facility]   [address] [place] [address]  [phone]     [employer]  [address] [place], [place]  [phone]     [employer] [address], [address]  [phone]     [employer]  [address] [place] [address], [address], [place] [address] [phone]

## 2024-11-22 ENCOUNTER — TELEPHONE (OUTPATIENT)
Age: 34
End: 2024-11-22

## 2024-11-27 ENCOUNTER — RESULTS FOLLOW-UP (OUTPATIENT)
Dept: FAMILY MEDICINE CLINIC | Facility: CLINIC | Age: 34
End: 2024-11-27

## 2025-01-27 ENCOUNTER — OFFICE VISIT (OUTPATIENT)
Age: 35
End: 2025-01-27
Payer: COMMERCIAL

## 2025-01-27 VITALS — HEIGHT: 65 IN | BODY MASS INDEX: 33.82 KG/M2 | WEIGHT: 203 LBS

## 2025-01-27 DIAGNOSIS — L84 CALLUS OF TOE: ICD-10-CM

## 2025-01-27 DIAGNOSIS — M20.5X2 ADDUCTOVARUS ROTATION OF TOE, ACQUIRED, LEFT: Primary | ICD-10-CM

## 2025-01-27 PROCEDURE — 99213 OFFICE O/P EST LOW 20 MIN: CPT | Performed by: STUDENT IN AN ORGANIZED HEALTH CARE EDUCATION/TRAINING PROGRAM

## 2025-01-27 NOTE — PROGRESS NOTES
"[facility] Office Visit    ASSESSMENT     Diagnoses and all orders for this visit:    Adductovarus rotation of toe, acquired, left    Callus of toe           Problem List Items Addressed This Visit    None  Visit Diagnoses         Adductovarus rotation of toe, acquired, left    -  Primary      Callus of toe                PLAN  -Patient was educated regarding their condition  -Continue moisturizer to bilateral feet daily  -Continue offloading padding to the left fifth digit as needed  -Return to clinic as needed for new or worsening podiatric concerns      SUBJECTIVE    Chief Complaint:  Left pinky toe pain     Patient ID: Jacqui Deleon     11/1/2024: Jacqui is a pleasant 33-year-old female who presents today for evaluation of her left foot.  She states that her left pinky toe has been hurting her for the past 2 months.  She denies any injury to the area.  She denies any swelling but states that it is red sometimes and irritated.  She has not attempted any treatment so far.    1/27/2025: Jacqui is overall doing well today. She states that the pain in her left foot has improved. She has purchase a pair of [employer] shoes which has been extremely helpful. She does states that the pain is still on and off but better from her last visit.         The following portions of the patient's history were reviewed and updated as appropriate: allergies, current medications, past family history, past medical history, past social history, past surgical history and problem list.    Review of Systems   Constitutional: Negative.    HENT: Negative.     Respiratory: Negative.     Cardiovascular: Negative.    Gastrointestinal: Negative.    Musculoskeletal: Negative.    Skin:         Callus of toe   Neurological: Negative.          OBJECTIVE      Ht 5' 5\" (1.651 m)   Wt 92.1 kg (203 lb)   BMI 33.78 kg/m²        Physical Exam  Constitutional:       Appearance: Normal appearance.   HENT:      Head: " Normocephalic and atraumatic.   Eyes:      General:         Right eye: No discharge.         Left eye: No discharge.   Cardiovascular:      Rate and Rhythm: Normal rate and regular rhythm.      Pulses:           Dorsalis pedis pulses are 2+ on the right side and 2+ on the left side.        Posterior tibial pulses are 2+ on the right side and 2+ on the left side.   Pulmonary:      Effort: Pulmonary effort is normal.      Breath sounds: Normal breath sounds.   Skin:     General: Skin is warm.      Capillary Refill: Capillary refill takes less than 2 seconds.   Neurological:      Sensory: Sensation is intact. No sensory deficit.         Vascular:  -DP and PT pulses intact b/l  -Capillary refill time <2 sec b/l  -Digital hair growth: Present  -Skin temp: WNL    MSK:  -No pain on palpation noted today  -left fifth digit is in a position of adductovarus with frontal plane rotation causing the dorsal lateral aspect of the toe to be inferior and pressing on the ground.  -MMT is 5/5 to all muscle compartments of the lower extremity  -Ankle dorsiflexion >10 degrees with knee extended and knee flexed b/l    Derm:  -No lesions, abrasions, or open wounds noted  -No noted interdigital maceration, peeling, malodor  -Hyperkeratotic tissue is much improved to the left fifth digit laterally compared to the patient's previous visit

## 2025-04-28 ENCOUNTER — PATIENT MESSAGE (OUTPATIENT)
Dept: FAMILY MEDICINE CLINIC | Facility: CLINIC | Age: 35
End: 2025-04-28

## 2025-04-28 DIAGNOSIS — Z32.00 POSSIBLE PREGNANCY, NOT CONFIRMED: Primary | ICD-10-CM

## 2025-05-05 ENCOUNTER — APPOINTMENT (OUTPATIENT)
Age: 35
End: 2025-05-05
Payer: COMMERCIAL

## 2025-05-05 DIAGNOSIS — Z32.00 POSSIBLE PREGNANCY, NOT CONFIRMED: ICD-10-CM

## 2025-05-05 PROCEDURE — 36415 COLL VENOUS BLD VENIPUNCTURE: CPT

## 2025-05-05 PROCEDURE — 84702 CHORIONIC GONADOTROPIN TEST: CPT

## 2025-05-06 ENCOUNTER — RESULTS FOLLOW-UP (OUTPATIENT)
Dept: FAMILY MEDICINE CLINIC | Facility: CLINIC | Age: 35
End: 2025-05-06

## 2025-05-06 LAB — B-HCG SERPL-ACNC: 0.9 MIU/ML (ref 0–5)
